# Patient Record
(demographics unavailable — no encounter records)

---

## 2025-07-25 NOTE — HISTORY OF PRESENT ILLNESS
[[email or web address]] : Initial HPI    07/25/2025:     MRI independently reviewed: Conservative Care: Pain Medications: Past Injections: None Spine surgery: none Blood thinners: none